# Patient Record
Sex: FEMALE | Race: OTHER | Employment: FULL TIME | URBAN - METROPOLITAN AREA
[De-identification: names, ages, dates, MRNs, and addresses within clinical notes are randomized per-mention and may not be internally consistent; named-entity substitution may affect disease eponyms.]

---

## 2021-10-15 ENCOUNTER — HOSPITAL ENCOUNTER (EMERGENCY)
Age: 41
Discharge: HOME | End: 2021-10-15
Attending: EMERGENCY MEDICINE | Admitting: EMERGENCY MEDICINE

## 2021-10-15 ENCOUNTER — APPOINTMENT (OUTPATIENT)
Dept: GENERAL RADIOLOGY | Age: 41
End: 2021-10-15
Attending: EMERGENCY MEDICINE

## 2021-10-15 ENCOUNTER — OFFICE VISIT (OUTPATIENT)
Dept: FAMILY MEDICINE CLINIC | Age: 41
End: 2021-10-15

## 2021-10-15 VITALS
SYSTOLIC BLOOD PRESSURE: 112 MMHG | RESPIRATION RATE: 16 BRPM | HEART RATE: 89 BPM | TEMPERATURE: 98 F | DIASTOLIC BLOOD PRESSURE: 83 MMHG | OXYGEN SATURATION: 99 %

## 2021-10-15 VITALS
HEIGHT: 61 IN | DIASTOLIC BLOOD PRESSURE: 63 MMHG | OXYGEN SATURATION: 97 % | HEART RATE: 93 BPM | WEIGHT: 103 LBS | BODY MASS INDEX: 19.45 KG/M2 | SYSTOLIC BLOOD PRESSURE: 92 MMHG | TEMPERATURE: 97.5 F

## 2021-10-15 DIAGNOSIS — Z09 HOSPITAL DISCHARGE FOLLOW-UP: ICD-10-CM

## 2021-10-15 DIAGNOSIS — B20 SYMPTOMATIC HIV INFECTION (HCC): Primary | ICD-10-CM

## 2021-10-15 DIAGNOSIS — B20 HIV INFECTION, UNSPECIFIED SYMPTOM STATUS (HCC): Primary | ICD-10-CM

## 2021-10-15 DIAGNOSIS — Z23 NEEDS FLU SHOT: ICD-10-CM

## 2021-10-15 LAB
ALBUMIN SERPL-MCNC: 2.3 G/DL (ref 3.5–5)
ALBUMIN/GLOB SERPL: 0.4 {RATIO} (ref 1.1–2.2)
ALP SERPL-CCNC: 295 U/L (ref 45–117)
ALT SERPL-CCNC: 63 U/L (ref 12–78)
ANION GAP SERPL CALC-SCNC: 2 MMOL/L (ref 5–15)
AST SERPL-CCNC: 82 U/L (ref 15–37)
BASOPHILS # BLD: 0.1 K/UL (ref 0–0.1)
BASOPHILS NFR BLD: 1 % (ref 0–1)
BILIRUB SERPL-MCNC: 0.4 MG/DL (ref 0.2–1)
BUN SERPL-MCNC: 15 MG/DL (ref 6–20)
BUN/CREAT SERPL: 21 (ref 12–20)
CALCIUM SERPL-MCNC: 8.3 MG/DL (ref 8.5–10.1)
CHLORIDE SERPL-SCNC: 109 MMOL/L (ref 97–108)
CO2 SERPL-SCNC: 23 MMOL/L (ref 21–32)
COMMENT, HOLDF: NORMAL
CREAT SERPL-MCNC: 0.71 MG/DL (ref 0.55–1.02)
DIFFERENTIAL METHOD BLD: ABNORMAL
EOSINOPHIL # BLD: 0.5 K/UL (ref 0–0.4)
EOSINOPHIL NFR BLD: 12 % (ref 0–7)
ERYTHROCYTE [DISTWIDTH] IN BLOOD BY AUTOMATED COUNT: 19 % (ref 11.5–14.5)
GLOBULIN SER CALC-MCNC: 6.5 G/DL (ref 2–4)
GLUCOSE SERPL-MCNC: 86 MG/DL (ref 65–100)
HCT VFR BLD AUTO: 35.7 % (ref 35–47)
HGB BLD-MCNC: 11.1 G/DL (ref 11.5–16)
IMM GRANULOCYTES # BLD AUTO: 0 K/UL (ref 0–0.04)
IMM GRANULOCYTES NFR BLD AUTO: 0 % (ref 0–0.5)
LYMPHOCYTES # BLD: 1.9 K/UL (ref 0.8–3.5)
LYMPHOCYTES NFR BLD: 40 % (ref 12–49)
MCH RBC QN AUTO: 26.8 PG (ref 26–34)
MCHC RBC AUTO-ENTMCNC: 31.1 G/DL (ref 30–36.5)
MCV RBC AUTO: 86.2 FL (ref 80–99)
MONOCYTES # BLD: 0.3 K/UL (ref 0–1)
MONOCYTES NFR BLD: 6 % (ref 5–13)
NEUTS SEG # BLD: 1.9 K/UL (ref 1.8–8)
NEUTS SEG NFR BLD: 41 % (ref 32–75)
NRBC # BLD: 0 K/UL (ref 0–0.01)
NRBC BLD-RTO: 0 PER 100 WBC
PLATELET # BLD AUTO: 166 K/UL (ref 150–400)
PMV BLD AUTO: 10.9 FL (ref 8.9–12.9)
POTASSIUM SERPL-SCNC: 3.9 MMOL/L (ref 3.5–5.1)
PROT SERPL-MCNC: 8.8 G/DL (ref 6.4–8.2)
RBC # BLD AUTO: 4.14 M/UL (ref 3.8–5.2)
SAMPLES BEING HELD,HOLD: NORMAL
SODIUM SERPL-SCNC: 134 MMOL/L (ref 136–145)
TROPONIN-HIGH SENSITIVITY: 10 NG/L (ref 0–51)
WBC # BLD AUTO: 4.6 K/UL (ref 3.6–11)

## 2021-10-15 PROCEDURE — 99285 EMERGENCY DEPT VISIT HI MDM: CPT

## 2021-10-15 PROCEDURE — 93005 ELECTROCARDIOGRAM TRACING: CPT

## 2021-10-15 PROCEDURE — 36415 COLL VENOUS BLD VENIPUNCTURE: CPT

## 2021-10-15 PROCEDURE — 1111F DSCHRG MED/CURRENT MED MERGE: CPT | Performed by: NURSE PRACTITIONER

## 2021-10-15 PROCEDURE — 99202 OFFICE O/P NEW SF 15 MIN: CPT | Performed by: NURSE PRACTITIONER

## 2021-10-15 PROCEDURE — 80053 COMPREHEN METABOLIC PANEL: CPT

## 2021-10-15 PROCEDURE — 85025 COMPLETE CBC W/AUTO DIFF WBC: CPT

## 2021-10-15 PROCEDURE — 71045 X-RAY EXAM CHEST 1 VIEW: CPT

## 2021-10-15 PROCEDURE — 90686 IIV4 VACC NO PRSV 0.5 ML IM: CPT

## 2021-10-15 PROCEDURE — 84484 ASSAY OF TROPONIN QUANT: CPT

## 2021-10-15 PROCEDURE — 90471 IMMUNIZATION ADMIN: CPT

## 2021-10-15 RX ORDER — ONDANSETRON 4 MG/1
4 TABLET, ORALLY DISINTEGRATING ORAL
COMMUNITY

## 2021-10-15 RX ORDER — SULFAMETHOXAZOLE AND TRIMETHOPRIM 800; 160 MG/1; MG/1
1 TABLET ORAL DAILY
COMMUNITY

## 2021-10-15 RX ORDER — CEPHALEXIN 500 MG/1
500 CAPSULE ORAL 4 TIMES DAILY
COMMUNITY

## 2021-10-15 NOTE — ED PROVIDER NOTES
This is a 58-year-old female with known HIV disease and ran out of her antivirals a month and a half ago. It is unknown exactly what she was taking. She was admitted to [facility] on the fourth for some chest and abdominal pain. She was discharged on 9 October of this year after further evaluation and treatment. She was referred to [facility] and infectious disease for her antiviral medications. She apparently is having some difficulty affording her medicines. She went to [facility] who then sent the patient here for further evaluation and medications. It is unclear exactly what her current medical condition is as we do not have any immediate access to her medical records at [facility].  I have requested these records but I am not optimistic that they will come in any reasonable amount of time. Patient denies any fever or chills and she is not been coughing anything up. She also states that the chest and abdominal discomfort she is having now is no different than what she had when she was admitted to [facility].  The only medicine given to her by [facility] was Zofran as Keflex. Her primary concern is medication at this point. The [facility] nurse practitioner also had requested blood work. No past medical history on file. No past surgical history on file. No family history on file.     Social History     Socioeconomic History    Marital status: SINGLE     Spouse name: Not on file    Number of children: Not on file    Years of education: Not on file    Highest education level: Not on file   Occupational History    Not on file   Tobacco Use    Smoking status: Never Smoker    Smokeless tobacco: Never Used   Substance and Sexual Activity    Alcohol use: Not Currently    Drug use: Never    Sexual activity: Not on file   Other Topics Concern    Not on file   Social History Narrative    Not on file     Social Determinants of Health     Financial Resource Strain:     Difficulty of Paying Living Expenses:    Food Insecurity:     Worried About Running Out of Food in the Last Year:     [address] in the Last Year:    Transportation Needs:     Lack of Transportation (Medical):  Lack of Transportation (Non-Medical):    Physical Activity:     Days of Exercise per Week:     Minutes of Exercise per Session:    Stress:     Feeling of Stress :    Social Connections:     Frequency of Communication with Friends and Family:     Frequency of Social Gatherings with Friends and Family:     Attends Ten[employer] Services:     Active Member of Clubs or Organizations:     Attends Club or Organization Meetings:     Marital Status:    Intimate Partner Violence:     Fear of Current or Ex-Partner:     Emotionally Abused:     Physically Abused:     Sexually Abused: ALLERGIES: Patient has no known allergies. Review of Systems   Constitutional: Negative for activity change, appetite change, chills, fatigue and fever. HENT: Negative for ear pain, facial swelling, sore throat and trouble swallowing. Eyes: Negative for pain, discharge and visual disturbance. Respiratory: Positive for cough. Negative for chest tightness, shortness of breath and wheezing. Cardiovascular: Positive for chest pain. Negative for palpitations. Patient was noted to have a holosystolic murmur on chest evaluation at [facility].   Gastrointestinal: Positive for abdominal pain. Negative for blood in stool, nausea and vomiting. Patient's only complaint is some persistent abdominal discomfort but this was present when she was evaluated and treated at [facility] earlier this month. Genitourinary: Negative for difficulty urinating, flank pain and hematuria. Musculoskeletal: Negative for arthralgias, joint swelling, myalgias and neck pain. Skin: Negative for color change and rash. Neurological: Negative for dizziness, weakness, numbness and headaches.    Hematological: Negative for adenopathy. Does not bruise/bleed easily. Psychiatric/Behavioral: Negative for behavioral problems, confusion and sleep disturbance. All other systems reviewed and are negative. Vitals:    10/15/21 1321 10/15/21 1426   BP: 114/81    Pulse: 89    Resp: 16    Temp: 98 °F (36.7 °C)    SpO2: 99% 98%            Physical Exam  Vitals and nursing note reviewed. Constitutional:       General: She is not in acute distress. Appearance: She is well-developed. Comments: This is a 26-year-old female who does not appear to be in any acute distress. Vital signs are as noted. HENT:      Head: Normocephalic and atraumatic. Nose: Nose normal.   Eyes:      General: No scleral icterus. Conjunctiva/sclera: Conjunctivae normal.      Pupils: Pupils are equal, round, and reactive to light. Neck:      Thyroid: No thyromegaly. Vascular: No JVD. Trachea: No tracheal deviation. Comments: No carotid bruits noted. Cardiovascular:      Rate and Rhythm: Normal rate and regular rhythm. Heart sounds: Murmur ( There is what sounds like a harsh holosystolic murmur the anterior chest.) heard. No friction rub. No gallop. Pulmonary:      Effort: Pulmonary effort is normal. No respiratory distress. Breath sounds: Normal breath sounds. No wheezing or rales. Chest:      Chest wall: No tenderness. Abdominal:      General: Bowel sounds are normal. There is no distension. Palpations: Abdomen is soft. There is no mass. Tenderness: There is no abdominal tenderness. There is no guarding or rebound. Musculoskeletal:         General: No tenderness. Normal range of motion. Cervical back: Normal range of motion and neck supple. Lymphadenopathy:      Cervical: No cervical adenopathy. Skin:     General: Skin is warm and dry. Capillary Refill: Capillary refill takes 2 to 3 seconds. Findings: No erythema or rash.    Neurological:      Mental Status: She is alert and oriented to person, place, and time. Cranial Nerves: No cranial nerve deficit. Coordination: Coordination normal.      Deep Tendon Reflexes: Reflexes are normal and symmetric. Psychiatric:         Behavior: Behavior normal.         Thought Content: Thought content normal.         Judgment: Judgment normal.          MDM  Number of Diagnoses or Management Options     Amount and/or Complexity of Data Reviewed  Clinical lab tests: ordered and reviewed  Tests in the radiology section of CPT®: ordered and reviewed  Decide to obtain previous medical records or to obtain history from someone other than the patient: yes  Review and summarize past medical records: yes  Discuss the patient with other providers: yes    Risk of Complications, Morbidity, and/or Mortality  Presenting problems: high  Diagnostic procedures: high  Management options: high    Patient Progress  Patient progress: stable         Procedures    This is a 74-year-old female who presents with chronic chest discomfort and abdominal discomfort. She has not taken her antiviral medications over the last month and a half. She was recently treated and evaluated for 5 days and [facility] [place] and discharged earlier this month. She is here at the direction of [facility] because she needs her HIV medications. She was supposed to have been evaluated by ID down at [facility] for this problem. Nurse practitioner for care of and it also asked for some medication. Ill chart has been requested from [facility] although its not anticipated this will arrive in a reasonable amount of time. We will ask case management to become involved in this patient's disposition. 5:30 PM we are awaiting the case management's evaluation of this fluctuation. I did speak to ID, and it appears the patient may have a relationship with [facility]    Patient has been evaluated by case management.   It sounds as though she has been getting her medications from [facility] but decided to go to [facility] because it was more convenient to get there than it was to go to [facility]. She was referred to [facility] by [facility].  I have spoken with infectious disease here and he advises the patient go to [facility] for continuation of of ongoing care. Case management is determined that the patient does have ability to pay for her medications. Was some question about not being able to afford those over the last month and a half and that is why she has not taken any. There is also some question about insurance. All things considered the patient should be referred back to [facility] for continuity of care. The patient has no significant records here and none of the records requested from [facility] have been received here as of yet. 7:13 PM

## 2021-10-15 NOTE — PROGRESS NOTES
Informed patient that she is being referred by provider to ED. Address was provided for patient. I gave patient a copy of the financial assistance application today. I have explained to patient that they must complete this after they have received their first bill from [employer] and that they need to put the account number of a bill on the application and mail it to address at bottom of application. I told patient that it might take 90 days to hear from [facility] and to call the billing office on each bill in the meantime to have account put on hold. Patient also told to call [facility] after 90 days to speak with  if they need further assistance with navigating this process. RN called to [facility] and gave report to Billie Camejo. An After Visit Summary was printed and reviewed with the patient.   Little Florez

## 2021-10-15 NOTE — PROGRESS NOTES
10/15/2021 : Emily Gutierrez (: 1980) is a 42 y.o. female, new patient, here for evaluation of the following chief complaint(s):  Hospital Follow Up ([facility] 10/4/2021)     ASSESSMENT/PLAN:  Below is the assessment and plan developed based on review of pertinent history, physical exam, labs, studies, and medications. 1. Symptomatic HIV infection ([address]  Assessment was needs BMP and follow up [facility] or [facility], Infectious Disease, for HIV treatment. Today loud systolic murmur, patient is weak  SUBJECTIVE/OBJECTIVE:  HPI She was seen for chest pain and abdominal pain on 10/4/21-10/9/21. HIV+, chest pain, small bowel obstruction, diarrhea, abdominal pain, shingles, n/v/diarrhea, dehydration, abdominal pain, enterocolitis, pericardial effusion. Needs follow up with ID for Highly active antiretroviral therapy (HAART) is a medication regimen used to manage and treat human immunodeficiency virus type 1 (HIV-1). She is taking cephalexin 500mg tid, Ondansetron ODT 4 mg, and bactrim 800/160, 1 po daily. No results found for any visits on 10/15/21. Review of Systems: Negative for: fever. Social History:  reports that she has never smoked. She has never used smokeless tobacco. She reports previous alcohol use. She reports that she does not use drugs. Current Medications:   Current Outpatient Medications   Medication Sig    ondansetron (ZOFRAN ODT) 4 mg disintegrating tablet Take 4 mg by mouth every eight (8) hours as needed for Nausea or Vomiting. 1 po every 8 hours for nausea    cephALEXin (KEFLEX) 500 mg capsule Take 500 mg by mouth four (4) times daily. 1 po 3 times per day    trimethoprim-sulfamethoxazole (BACTRIM DS, SEPTRA DS) 160-800 mg per tablet Take 1 Tablet by mouth two (2) times a day.  1 po daily     Physical Examination:   Vitals:    10/15/21 1012   BP: 92/63   Pulse: 93   Temp: 97.5 °F (36.4 °C)   TempSrc: Temporal   SpO2: 97%   Weight: 103 lb (46.7 kg) Height: 5' 0.63\" (1.54 m)    Patient's last menstrual period was 09/10/2021. General appearance - well developed, appears ill. Chest - wheezing noted left upper lobe. Heart - Loud holosystolic murmur at left sternal border. Abdomen - bowel sounds hypoactive x 4. Extremities - nonpitting tr edema lower legs. An electronic signature was used to authenticate this note.   -- Taylor Aquino, NP

## 2021-10-15 NOTE — ED NOTES
Pt reports that she ran of her HIV antivirals about a month and a half ago. Since she ran out she has been experiencing chest/abd pain. She cannot afford to buy her meds.

## 2021-10-15 NOTE — PROGRESS NOTES
Care Management - Received call from Dr. Mandeep Ricci regarding consult for patient's inability to pay for her medications. Patient is not able to afford her HIV medications. She has not taken them in 1.5 months. She was admitted to [facility] 10-4 to 10-9-21 for lung and abdominal issues. The discharge instructions told her to follow up at [facility] and [facility]. Patient went to [address] [place] [address] today. They did lab work and sent her to the ER. Dr. Mandeep Ricci spoke with ID here. They said they could see her free, but she would have an out of pocket cost of $1500 for [facility] each visit plus her medication cost. Patient only speaks [place] Spoke at great length with patient using the language interpretor video line. Patient was going to [facility] in the [facility]. Patient was seen in  clinic there and was obtaining her medication through them. About 3 months ago, she told them that it was difficult for to get there because of the distance and having to try to get a ride. They suggested she go to [facility]. [facility] told her they could not assist her because she has insurance. She said she has insurance through her children' father. She took the prescriptions to [address] [place] [address] [phone], but they told her insurance did not cover the medicines. CM asked if she had the insurance card. She gave to CM. CM took the card to registration. It came back not valid. The card was printed in 2017. Her ex- showed up with the new card he had just received this week. This also came back not valid. CM explained to patient that her best option is to return to [facility]. It would be less expensive to pay for a cab to [facility] than it would be to pay for the medications out of pocket. Updated patient's RN. Updated Dr. Mandeep Ricci. He is in agreement with patient's return to [facility] for follow up care. Called [facility] ([phone]). Spoke with Safia Jeong, pharmacy manager.  She said they do not have insurance on file for patient.  They have been using a discount card for patient's other medications since August.     GRABIEL Ritter

## 2021-10-15 NOTE — DISCHARGE INSTRUCTIONS
You have been evaluated by our  here. It is clear that she been receiving your medicines from [facility]. In speaking with infectious disease here it is felt more important that you follow-up with them as they know your case. We have not been able to get any medical records from [facility] and have no other significant records here that would aid in the provision of HIV medication. Since your care is already established at [facility] we recommend you return there for continued care.

## 2021-10-15 NOTE — PROGRESS NOTES
Joanna Jennings with [facility] administered this flu vaccine: Requests flu vaccine; denies fever, egg allergy. Immunization given per protocol and [facility] VIS information sheet given, explained possible S/E. Reviewed sx indicating need to be seen in ER. Pt had no adverse reaction at time of discharge. Romina Lmeus RN

## 2021-10-15 NOTE — ED TRIAGE NOTES
Triage: Pt arrives from the [address] [place] [address]. She has a hx of HIV+. She was seen there for generalized weakness and fatigue. [facility] and they could not access them so she was referred here for further evaluation.

## 2021-10-15 NOTE — PROGRESS NOTES
Coordination of Care  1. Have you been to the ER, urgent care clinic since your last visit? Hospitalized since your last visit? Yes When: 10/4/2021- [facility] - chest pain/abdominal pain    2. Have you seen or consulted any other health care providers outside of the [address] [place] [address] since your last visit? Include any pap smears or colon screening. No    Does the patient need refills? NO    Learning Assessment Complete?  yes  Depression Screening complete in the past 12 months? yes

## 2021-10-16 LAB
ATRIAL RATE: 88 BPM
CALCULATED P AXIS, ECG09: 59 DEGREES
CALCULATED R AXIS, ECG10: 120 DEGREES
CALCULATED T AXIS, ECG11: -90 DEGREES
DIAGNOSIS, 93000: NORMAL
P-R INTERVAL, ECG05: 158 MS
Q-T INTERVAL, ECG07: 384 MS
QRS DURATION, ECG06: 92 MS
QTC CALCULATION (BEZET), ECG08: 464 MS
VENTRICULAR RATE, ECG03: 88 BPM

## 2021-10-18 ENCOUNTER — PATIENT OUTREACH (OUTPATIENT)
Dept: FAMILY MEDICINE CLINIC | Age: 41
End: 2021-10-18

## 2021-10-18 RX ORDER — CIPROFLOXACIN 500 MG/1
TABLET ORAL DAILY
COMMUNITY

## 2021-10-18 NOTE — PROGRESS NOTES
ED followup:    Received FYI IB message from pcp to advise that patient had been referred to [facility] for HIV tx. This is not a new problem. NN reviewed chart:      Pt referred to ED 10/15/21 due to had run out of meds that she needs for her treatment. Reported that she had been a patient at [facility] in the past but had stopped due to problems with transportation. See ED CM note:  Pt had insurance however currently her card is not valid. She was advised to rt to [facility] where she was an established patient, explained that cab is much less expense than the medications. Call to pt , assisted by AMN :  71 min call, required extensive repetition and questions. Although approved  was used, NN frequently had to repeat questions and ask for clarification from patient to be sure because she seemed to not understand or would not directly answer the question. Symptoms: Pt c/o swelling in her abdomen, face and feet that has been present for \"about 4 months\". Also c/o shortness of breath \"all the time\"; No audible sob during call was noted. Later in the call when I asked again about sob, pt states it \"worsens with activity\"    Per Care Everywhere patient has seen cardiologist and dx of Unspecified Heart Failure is noted, however progress notes and treatment plan are not viewable. Pt reports she saw the heart doctor on 9/24/21 and was told to go \"straight over to the hospital\". Continued questioning revealed she did not go until 10/4, at which time she went to [facility] ED. Admitted 10/4-10/9/21 to [address] [place] [address] states she is to rt cardiologist in November. She could not tell me the name of cardiologist but it is at [facility] - [phone]. Care Everywhere search shows last encounter at [facility] 8/22/2017;  NN did not query pt about this due to found this info after call concluded.      Medication History and Rec:  Pt has bottles of 3 abx and Zofran, however tells me she does not take the Cipro because it causes pain. Med list updated to show what pt read to me off her bottles and what she told me she is taking. She reports no cardiac meds. ** Reports she last took her HIV Tx meds about 8mo ago. I asked her this several times, in different ways,  because she reportedly told Ed doc it had been 1.5mo w/o the HIV meds  Pt goes on to tell me that [facility] sent her to [facility] but [facility] told her \"NO\" because she has insurance. Insurance card, however, was found to be invalid by Ed case manager on 10/15/21. Assessment:  Gaps in care are apparent   Pt is w/o medication for HIV  Question why she did not rt to [facility] and was the last encounter there in 2017 or more recent? [facility] would be more convenient for patient which will help with transportation barrier. Cardiac abnormality - unclear if HF is a dx ;  abnl ECG on 10/15/21  Needs follow up, maybe [facility] referral for cardiology    Plan:   Call [facility]  Call [facility]  ?  Contact [facility]   Consider [facility] for Cardiology  May need pcp appt to review all;  Route to pcp for review/[facility]

## 2021-10-18 NOTE — Clinical Note
TEE Stephens,  Please review my note, let me know your thoughts please - call me if it's easier. It was after viki when I talked to pt so have not made any of the follow up calls yet.      Thank you,  Denise Chaparro

## 2021-10-18 NOTE — Clinical Note
Rex Rangel,    This is just fyi . ..   hopefully pt will keep appt today at [facility]. She has been w/o treatment 3-4yrs.     Tx,  [employer]

## 2021-10-19 NOTE — PROGRESS NOTES
Hi Tal Hernandez has Infectious Disease and she can get her HIV medications free/low cost there. I am not sure about [facility]. I can appreciate patient's desire to see us due to transportation issues. However, we are not set up as a HIV clinic. She will need to go to the clinic that has the resources she needs. Call me if you have any questions.   Thanks, Kody

## 2021-10-19 NOTE — PROGRESS NOTES
Case Management Update:    1. Call to [facility] \"TIPS\" Program coord: \"Loida\":  Pt is in their system from 3/2014, registered a as a hospital follow up however never had an appointment there. They do work with all insurance in the [employer] program, so even if pt has insurance she can still obtain tx there. Their doctor for HIV tx can also be pcp. Debbie Islas, who is bilingual, called patient:  Appt scheduled for 10/21/21 @ 1:30 pm  Pt reported that she was diagnosed with HIV when she gave birth 7yrs ago. Pt told Debbie Islas she has been without meds for about 3yrs! Pt was unclear w/ Vigneshanuradha Islas re if she was seen in ED at [facility] or if for HIV treatment. Care Everywhere search indicates last known encounters at [facility] were Aug/Sept 2017.    2.  Call to [address] [place] [address] Cardiovascular Specialist:  Pt was seen only at [facility] during recent hospitalization. A messgae will be given to one of the nurses to call me back to advise what the recommended follow up should be, since patient reported she does not know. /viviana     10/20/21:  Call back from Mike, \"Ginny\":  NE instructions were [facility] not [facility], due to \"severe pulmonary htn\" was diagnosed during admission. No new meds were given at dc'd. NN attempted to contact Debbie Islas at [facility] requesting call back. Pt has appt there tomorrow. It will be impt for her to request records from [facility];  Patient will need AN referral for [facility]. 10/21/21: Call back from Debbie Islas with [facility] ( HIV treatment / Case Mgmt): I provided above info for continuity of care and recommended she request full records from [facility] recent admission. They have been able to access [facility] records and confirmed pt last seen there in 2017. Pt has appt at [facility] this afternoon.   Debbie Islas will advise NN if pt establishes care at [facility], which is the goal .    Route to pcp to update her./viviana

## 2023-05-15 RX ORDER — CEPHALEXIN 500 MG/1
500 CAPSULE ORAL 4 TIMES DAILY
COMMUNITY

## 2023-05-15 RX ORDER — SULFAMETHOXAZOLE AND TRIMETHOPRIM 800; 160 MG/1; MG/1
1 TABLET ORAL DAILY
COMMUNITY

## 2023-05-15 RX ORDER — CIPROFLOXACIN 500 MG/1
TABLET, FILM COATED ORAL DAILY
COMMUNITY

## 2023-05-15 RX ORDER — ONDANSETRON 4 MG/1
4 TABLET, ORALLY DISINTEGRATING ORAL EVERY 8 HOURS PRN
COMMUNITY